# Patient Record
Sex: MALE | Race: WHITE | URBAN - METROPOLITAN AREA
[De-identification: names, ages, dates, MRNs, and addresses within clinical notes are randomized per-mention and may not be internally consistent; named-entity substitution may affect disease eponyms.]

---

## 2020-07-13 ENCOUNTER — HOSPITAL ENCOUNTER (EMERGENCY)
Facility: HOSPITAL | Age: 40
Discharge: HOME | End: 2020-07-13
Attending: EMERGENCY MEDICINE

## 2020-07-13 VITALS
WEIGHT: 178 LBS | TEMPERATURE: 98 F | HEIGHT: 74 IN | OXYGEN SATURATION: 99 % | DIASTOLIC BLOOD PRESSURE: 72 MMHG | BODY MASS INDEX: 22.84 KG/M2 | RESPIRATION RATE: 16 BRPM | SYSTOLIC BLOOD PRESSURE: 110 MMHG | HEART RATE: 58 BPM

## 2020-07-13 DIAGNOSIS — S76.212A STRAIN OF LEFT GROIN: Primary | ICD-10-CM

## 2020-07-13 DIAGNOSIS — R22.9 SKIN NODULE: ICD-10-CM

## 2020-07-13 DIAGNOSIS — R55 VASOVAGAL REACTION: ICD-10-CM

## 2020-07-13 LAB
ATRIAL RATE: 55 BPM
P AXIS: 63 DEGREES
P-R INTERVAL: 150 MS
Q-T INTERVAL: 416 MS
QRS DURATION: 104 MS
QTC CALCULATION (BEZET): 397 MS
R AXIS: 81 DEGREES
T AXIS: 61 DEGREES
VENTRICULAR RATE: 55 BPM

## 2020-07-13 PROCEDURE — 93010 ELECTROCARDIOGRAM REPORT: CPT

## 2020-07-13 PROCEDURE — 99283 EMERGENCY DEPT VISIT LOW MDM: CPT

## 2020-07-13 PROCEDURE — 99284 EMERGENCY DEPT VISIT MOD MDM: CPT

## 2020-07-13 PROCEDURE — 93005 ELECTROCARDIOGRAM TRACING: CPT

## 2020-07-13 RX ORDER — HYDROCODONE BITARTRATE AND ACETAMINOPHEN 5; 325 MG/1; MG/1
1 TABLET ORAL ONCE
Status: DISCONTINUED | OUTPATIENT
Start: 2020-07-13 | End: 2020-07-13

## 2020-07-13 RX ORDER — IBUPROFEN 600 MG/1
600 TABLET ORAL ONCE
Status: COMPLETED | OUTPATIENT
Start: 2020-07-13 | End: 2020-07-13

## 2020-07-13 RX ORDER — CYCLOBENZAPRINE HCL 10 MG
10 TABLET ORAL 3 TIMES DAILY PRN
Qty: 20 TABLET | Refills: 0 | Status: SHIPPED | OUTPATIENT
Start: 2020-07-13 | End: 2020-07-20

## 2020-07-13 NOTE — ED INITIAL ASSESSMENT (HPI)
Pt to ED by EMS with c/o back pain and right groin pain. PT reports chronic lower back that got worse after lifting a heavy desk top computer.  PT states he has noted \"lumps\" in his right groin

## 2020-07-13 NOTE — ED PROVIDER NOTES
Patient Seen in: [facility] Emergency Department      History   Patient presents with:  Back Pain    Stated Complaint: back pain     HPI    This is a 20-year-old male complaining of left groin pain patient states that he was lifting a heavy computer distress HEENT exam within normal limits neck is no lymphadenopathy or JVD lungs are clear cardiovascular exam shows regular rate and rhythm without murmurs abdomen is soft and nontender back is nontender the motor strength lower extremities normal sensati Medication List    START taking these medications    cyclobenzaprine 10 MG Oral Tab  Take 1 tablet (10 mg total) by mouth 3 (three) times daily as needed for Muscle spasms.   Qty: 20 tablet Refills: 0

## (undated) NOTE — LETTER
July 13, 2020    Patient: Lewis Bravo   Date of Visit: 7/13/2020       To Whom It May Concern:    Lewis Bravo was seen and treated in our emergency department on 7/13/2020. He should not return to work until July 16 May return sooner if better.     If